# Patient Record
Sex: FEMALE | Race: WHITE | Employment: FULL TIME | URBAN - METROPOLITAN AREA
[De-identification: names, ages, dates, MRNs, and addresses within clinical notes are randomized per-mention and may not be internally consistent; named-entity substitution may affect disease eponyms.]

---

## 2017-08-30 ENCOUNTER — OFFICE VISIT (OUTPATIENT)
Dept: ORTHOPEDIC SURGERY | Age: 37
End: 2017-08-30

## 2017-08-30 VITALS
HEIGHT: 63 IN | HEART RATE: 88 BPM | SYSTOLIC BLOOD PRESSURE: 120 MMHG | DIASTOLIC BLOOD PRESSURE: 87 MMHG | WEIGHT: 210.1 LBS | BODY MASS INDEX: 37.23 KG/M2

## 2017-08-30 DIAGNOSIS — S63.502A LEFT WRIST SPRAIN, INITIAL ENCOUNTER: ICD-10-CM

## 2017-08-30 DIAGNOSIS — M25.532 WRIST PAIN, LEFT: Primary | ICD-10-CM

## 2017-08-30 PROCEDURE — L3908 WHO COCK-UP NONMOLDE PRE OTS: HCPCS | Performed by: PHYSICIAN ASSISTANT

## 2017-08-30 PROCEDURE — 99213 OFFICE O/P EST LOW 20 MIN: CPT | Performed by: PHYSICIAN ASSISTANT

## 2017-08-30 PROCEDURE — 73110 X-RAY EXAM OF WRIST: CPT | Performed by: PHYSICIAN ASSISTANT

## 2017-09-07 ENCOUNTER — OFFICE VISIT (OUTPATIENT)
Dept: ORTHOPEDIC SURGERY | Age: 37
End: 2017-09-07

## 2017-09-07 VITALS
HEIGHT: 63 IN | DIASTOLIC BLOOD PRESSURE: 82 MMHG | SYSTOLIC BLOOD PRESSURE: 128 MMHG | HEART RATE: 64 BPM | BODY MASS INDEX: 37.23 KG/M2 | WEIGHT: 210.1 LBS

## 2017-09-07 DIAGNOSIS — S63.502A LEFT WRIST SPRAIN, INITIAL ENCOUNTER: Primary | ICD-10-CM

## 2017-09-07 PROCEDURE — 99203 OFFICE O/P NEW LOW 30 MIN: CPT | Performed by: ORTHOPAEDIC SURGERY

## 2019-03-07 ENCOUNTER — HOSPITAL ENCOUNTER (OUTPATIENT)
Age: 39
Discharge: HOME | End: 2019-03-07
Payer: COMMERCIAL

## 2019-03-07 ENCOUNTER — HOSPITAL ENCOUNTER (OUTPATIENT)
Dept: GENERAL RADIOLOGY | Age: 39
Discharge: HOME | End: 2019-03-07
Payer: COMMERCIAL

## 2019-03-07 DIAGNOSIS — R10.32 ABDOMINAL PAIN, BILATERAL LOWER QUADRANT: ICD-10-CM

## 2019-03-07 DIAGNOSIS — R10.31 ABDOMINAL PAIN, BILATERAL LOWER QUADRANT: ICD-10-CM

## 2019-03-07 PROCEDURE — 74022 RADEX COMPL AQT ABD SERIES: CPT

## 2020-03-21 ENCOUNTER — NURSE TRIAGE (OUTPATIENT)
Dept: OTHER | Facility: CLINIC | Age: 40
End: 2020-03-21

## 2021-10-15 ENCOUNTER — OFFICE VISIT (OUTPATIENT)
Dept: ORTHOPEDIC SURGERY | Age: 41
End: 2021-10-15
Payer: MEDICAID

## 2021-10-15 VITALS
HEIGHT: 63 IN | BODY MASS INDEX: 38.09 KG/M2 | HEART RATE: 60 BPM | DIASTOLIC BLOOD PRESSURE: 83 MMHG | SYSTOLIC BLOOD PRESSURE: 119 MMHG | WEIGHT: 215 LBS

## 2021-10-15 DIAGNOSIS — M22.42 CHONDROMALACIA PATELLAE OF LEFT KNEE: ICD-10-CM

## 2021-10-15 DIAGNOSIS — M70.62 GREATER TROCHANTERIC BURSITIS OF LEFT HIP: Primary | ICD-10-CM

## 2021-10-15 PROCEDURE — 99203 OFFICE O/P NEW LOW 30 MIN: CPT | Performed by: ORTHOPAEDIC SURGERY

## 2021-10-15 NOTE — LETTER
[id number] [facility] [address]  Phone: [phone]  Fax: [phone]    Fariha President, MD        October 15, 2021    [address] [place] [address]   1980  DOS 10/15/2021  Chief Complaint    Knee Pain (Bilateral knee pain) and Hip Pain (Bilateral hip pain)       History of Present Illness:  Asael Breen is a 41 y.o. female presents for evaluation of left hip and left knee pain. Check she has pain in both hips and both knees but her left side is the worst and more symptomatic side that is an issue. She has had work-up for last 5 years including medicine physical therapy formal several times. She has had a cortisone injection into the left hip with no relief. She has difficulty laying on either side and her knee bothers her when she goes up and down steps or squats down with grinding which is symptomatic. Medical History:  Patient's medications, allergies, past medical, surgical, social and family histories were reviewed and updated as appropriate. Review of Systems:  Well-documented patient history form dated 10/15/2021  Relevant review of systems reviewed and available in the patient's chart    Vital Signs:  Vitals:    10/15/21 1130   BP: 119/83   Pulse: 60       General Exam:   Constitutional: Patient is adequately groomed with no evidence of malnutrition  DTRs: Deep tendon reflexes are intact  Mental Status: The patient is oriented to time, place and person. The patient's mood and affect are appropriate. Lymphatic: The lymphatic examination bilaterally reveals all areas to be without enlargement or induration. Vascular: Examination reveals no swelling or calf tenderness. Peripheral pulses are palpable and 2+. Neurological: The patient has good coordination. There is no weakness or sensory deficit.     Hip Examination:    Inspection: No visible lesions    Palpation: She has tenderness to palpation over left greater trochanter, and she has crepitus throughout range of motion which is mild to moderate in her patellofemoral joint with range of motion and symptomatic upon compression    Range of Motion: Normal symmetric in both lower extremities    Strength: Normal symmetric in both lower extremities    Special Tests: Negative straight leg raise negative Lasegue's maneuver    Skin: There are no rashes, ulcerations or lesions. Gait: Unremarkable    Reflex diminished and symmetric    Additional Comments:       Additional Examinations:         Right Lower Extremity: Examination of the right lower extremity does not show any tenderness, deformity or injury. Range of motion is unremarkable. There is no gross instability. There are no rashes, ulcerations or lesions. Strength and tone are normal.    Radiology:     X-rays 2 views of the right and 2 views of the left knee reveal some Glide changes but nothing acute    X-rays AP of the pelvis with frog's of the right and left hip reveal some minimal degenerative change but nothing acute         Assessment : Left knee pain secondary to chondromalacia patella and left hip pain secondary to greater trochanteric bursitis in all likelihood    Impression:  Encounter Diagnoses   Name Primary?  Greater trochanteric bursitis of left hip Yes    Chondromalacia patellae of left knee        Office Procedures:  No orders of the defined types were placed in this encounter. Treatment Plan: At this time since she is failed her 5 years to have her pain alleviated despite a variety of medications, injections, and formal structured physical therapy through several courses including steroids several times, I would recommend requesting an MRI of her left hip for disposition. She may benefit an evaluation by one of our hip preservation specialist          Rochelle Leyden.  MD Jose Baldwin MD

## 2021-10-15 NOTE — PROGRESS NOTES
symmetric in both lower extremities    Special Tests: Negative straight leg raise negative Lasegue's maneuver    Skin: There are no rashes, ulcerations or lesions. Gait: Unremarkable    Reflex diminished and symmetric    Additional Comments:       Additional Examinations:         Right Lower Extremity: Examination of the right lower extremity does not show any tenderness, deformity or injury. Range of motion is unremarkable. There is no gross instability. There are no rashes, ulcerations or lesions. Strength and tone are normal.    Radiology:     X-rays 2 views of the right and 2 views of the left knee reveal some Reynaldo changes but nothing acute    X-rays AP of the pelvis with frog's of the right and left hip reveal some minimal degenerative change but nothing acute         Assessment : Left knee pain secondary to chondromalacia patella and left hip pain secondary to greater trochanteric bursitis in all likelihood    Impression:  Encounter Diagnoses   Name Primary?  Greater trochanteric bursitis of left hip Yes    Chondromalacia patellae of left knee        Office Procedures:  No orders of the defined types were placed in this encounter. Treatment Plan: At this time since she is failed her 5 years to have her pain alleviated despite a variety of medications, injections, and formal structured physical therapy through several courses including steroids several times, I would recommend requesting an MRI of her left hip for disposition.   She may benefit an evaluation by one of our hip preservation specialist

## 2021-12-15 ENCOUNTER — CLINICAL DOCUMENTATION (OUTPATIENT)
Dept: OTHER | Age: 41
End: 2021-12-15

## 2021-12-28 ENCOUNTER — OFFICE VISIT (OUTPATIENT)
Dept: ORTHOPEDIC SURGERY | Age: 41
End: 2021-12-28
Payer: MEDICAID

## 2021-12-28 DIAGNOSIS — M70.62 GREATER TROCHANTERIC BURSITIS OF LEFT HIP: Primary | ICD-10-CM

## 2021-12-28 DIAGNOSIS — S73.192D TEAR OF LEFT ACETABULAR LABRUM, SUBSEQUENT ENCOUNTER: ICD-10-CM

## 2021-12-28 PROBLEM — S73.192A TEAR OF LEFT ACETABULAR LABRUM: Status: ACTIVE | Noted: 2021-12-28

## 2021-12-28 PROCEDURE — 99212 OFFICE O/P EST SF 10 MIN: CPT | Performed by: ORTHOPAEDIC SURGERY

## 2021-12-28 NOTE — PROGRESS NOTES
She returns today for evaluation of her left hip after MRI. The MRI does confirm that she has bursitis but also reveals a hip labral tear. Because of that I would recommend that she be referred to one of her hip preservation specialist, Dr. Nivia Tineo for evaluation to see if she can be a candidate for surgical intervention. Additionally she says she can work 12 hours back to back in and beyond 3 days but has difficulty if she works longer so I would recommend limiting her to 12-hour shifts at work for the next 3 months.

## 2021-12-28 NOTE — Clinical Note
[address] [place] [id number]  Phone: [phone]  Fax: [phone]    Clem Galvan MD        December 28, 2021     Patient: Bessy Burnett   YOB: 1980   Date of Visit: 12/28/2021       To Whom It May Concern: It is my medical opinion that Bessy Burnett {Work release (duty restriction):80214}. If you have any questions or concerns, please don't hesitate to call.     Sincerely,        Clem Galvan MD

## 2021-12-28 NOTE — LETTER
[address] [place] [address]  [facility] [address] [id number]  Phone: [phone]  Fax: [phone]    Rj Galvan MD        December 28, 2021     Patient: Aleyda Payton   YOB: 1980   Date of Visit: 12/28/2021       To Whom It May Concern: It is my medical opinion that [address] [place] 12 HR SHIFT. FOR NEXT 3 MONTHS. If you have any questions or concerns, please don't hesitate to call. Sincerely,          MD Rj Young MD

## 2022-02-17 ENCOUNTER — OFFICE VISIT (OUTPATIENT)
Dept: ORTHOPEDIC SURGERY | Age: 42
End: 2022-02-17
Payer: MEDICAID

## 2022-02-17 VITALS — BODY MASS INDEX: 39.12 KG/M2 | HEIGHT: 62 IN | WEIGHT: 212.6 LBS

## 2022-02-17 DIAGNOSIS — M70.62 GREATER TROCHANTERIC BURSITIS OF LEFT HIP: Primary | ICD-10-CM

## 2022-02-17 DIAGNOSIS — S73.192D TEAR OF LEFT ACETABULAR LABRUM, SUBSEQUENT ENCOUNTER: ICD-10-CM

## 2022-02-17 DIAGNOSIS — M25.852 IMPINGEMENT SYNDROME, HIP, LEFT: ICD-10-CM

## 2022-02-17 PROCEDURE — 20611 DRAIN/INJ JOINT/BURSA W/US: CPT | Performed by: ORTHOPAEDIC SURGERY

## 2022-02-17 RX ORDER — BETAMETHASONE SODIUM PHOSPHATE AND BETAMETHASONE ACETATE 3; 3 MG/ML; MG/ML
12 INJECTION, SUSPENSION INTRA-ARTICULAR; INTRALESIONAL; INTRAMUSCULAR; SOFT TISSUE ONCE
Status: COMPLETED | OUTPATIENT
Start: 2022-02-17 | End: 2022-02-17

## 2022-02-17 RX ORDER — BUPIVACAINE HYDROCHLORIDE 2.5 MG/ML
30 INJECTION, SOLUTION INFILTRATION; PERINEURAL ONCE
Status: COMPLETED | OUTPATIENT
Start: 2022-02-17 | End: 2022-02-17

## 2022-02-17 RX ORDER — LIDOCAINE HYDROCHLORIDE 10 MG/ML
20 INJECTION, SOLUTION INFILTRATION; PERINEURAL ONCE
Status: COMPLETED | OUTPATIENT
Start: 2022-02-17 | End: 2022-02-17

## 2022-02-17 RX ADMIN — BETAMETHASONE SODIUM PHOSPHATE AND BETAMETHASONE ACETATE 12 MG: 3; 3 INJECTION, SUSPENSION INTRA-ARTICULAR; INTRALESIONAL; INTRAMUSCULAR; SOFT TISSUE at 11:34

## 2022-02-17 RX ADMIN — LIDOCAINE HYDROCHLORIDE 20 ML: 10 INJECTION, SOLUTION INFILTRATION; PERINEURAL at 11:35

## 2022-02-17 RX ADMIN — BUPIVACAINE HYDROCHLORIDE 75 MG: 2.5 INJECTION, SOLUTION INFILTRATION; PERINEURAL at 11:35

## 2022-02-17 NOTE — PROGRESS NOTES
Dr Mike Zee      Date /Time 2/17/2022       12:08 PM EST  Name Christo Connor             1980   Location  [facility]  MRN <[id number]>                Chief Complaint   Patient presents with    New Patient     Left Hip Eval - Ref. by Bonny Rush        History of Present Illness    Christo Connor is a 35 y.o. female who presents with  left hip pain. Sent in consultation by  Luis Peña MD.    Occupation: [employer] at [employer]  Occupational activities: heavy lifting occasionally. Athletic/exercise activity: no sports. Injury Mechanism:  none. Worker's Comp. & legal issues:   none. Previous Treatments: Ice, Heat, NSAIDs, pain medication and Cortisone Injections Lateral cortisone injection of the trochanter    She presents with longstanding history of left hip pain. Remotely, she has had left hip injection done previously for the trochanteric bursitis. She has had previous stretching exercises and therapy done for this which have not helped thus far. She is here for anteriorly and laterally based hip pain. She underwent an MRI that demonstrated labral tear and she is here for clinical evaluation. She has difficult time getting in and out of her car and tying her shoes. She has a pain when she gets up from a prolonged sitting position. She has difficult time working for long hours and is activity related discomfort    Past History  Past Medical History:   Diagnosis Date    Acid indigestion     Fibromyalgia     GERD (gastroesophageal reflux disease)     Obesity (BMI 30-39. 9)      Past Surgical History:   Procedure Laterality Date    CHOLECYSTECTOMY      ENDOSCOPY, COLON, DIAGNOSTIC      TONSILLECTOMY      TUBAL LIGATION       Family History   Problem Relation Age of Onset    High Blood Pressure Mother     Diabetes Mother     Cancer Father      Social History     Tobacco Use    Smoking status: Former Smoker     Packs/day: 0.50     Years: 18.00     Pack years: 9.00    Smokeless tobacco: Never Used    Tobacco comment: smokes half pack now. was smoking 1 pack/day   Substance Use Topics    Alcohol use: No      Current Outpatient Medications on File Prior to Visit   Medication Sig Dispense Refill    ferrous sulfate 325 (65 FE) MG tablet Take 325 mg by mouth daily (with breakfast).  pantoprazole (PROTONIX) 20 MG tablet Take 20 mg by mouth daily. No current facility-administered medications on file prior to visit. ASCVD 10-YEAR RISK SCORE  The ASCVD Risk score (Davian Escobar, et al., 2013) failed to calculate for the following reasons: The systolic blood pressure is missing    Cannot find a previous HDL lab    Cannot find a previous total cholesterol lab   . Review of Systems  10-point ROS is negative other than HPI. Physical Exam  Based off 1997 Exam Criteria  Ht 5' 2.25\" (1.581 m)   Wt 212 lb 9.6 oz (96.4 kg)   BMI 38.57 kg/m²      Constitutional:       General: He is not in acute distress. Appearance: Normal appearance. Obese. Cardiovascular:      Rate and Rhythm: Normal rate and regular rhythm. Pulses: Normal pulses. Pulmonary:      Effort: Pulmonary effort is normal. No respiratory distress. Neurological:      Mental Status: He is alert and oriented to person, place, and time. Mental status is at baseline.      Musculoskeletal:  Gait:  antalgic    Spine / Hip Exam:      RIGHT  LEFT    Lumbar Spine Exam  [x] All Neg    [x] All Neg     Straight leg raise  []  []Not tested   []  []Not tested    Clonus  []  []Not tested   []  []Not tested    Pain with motion  []  []Not tested   []  []Not tested    Radiculopathy  []  []Not tested   []  []Not tested    Paraspinal muscle tenderness  [] Paraspinal  []Midline   [] Paraspinal  []Midline   Sensation RIGHT  LEFT    L3  [x] Normal []Decreased    [x] Normal []Decreased   L4  [x] Normal  []Decreased   [x] Normal []Decreased   L5  [x] Normal []Decreased   [x] Normal []Decreased   S1  [x] Normal []Decreased   [x] Normal []Decreased   Pelvis       Scoliosis  [x] Nml  [] Present     Leg-length discrepency  [x] Equal  [] Right longer   [] Left longer   Range of Motion Active Passive Active Passive   Hip Flexion 120  120    Abduction 50  50    External Rotation @ 90 flex 65  65    Internal Rotation @ 90 flex 20  10           Hip Impingement / Dysplasia  [] All Neg  [] Not tested   [] All Neg  [] Not tested    Hip impingement test  [x]  []Not tested   [x]  []Not tested    C-sign  []  []Not tested   [x]  []Not tested    Anterior instability apprehension  []  []Not tested   []  []Not tested    Posterior instability apprehension  []  []Not tested   []  []Not tested    Uncontained Internal rotation  []  []Not tested  []  []Not tested          Abductors  [] All Neg  [] Not tested   [] All Neg  [] Not tested    Medius strength  []  []Not tested   []  []Not tested    Minimum strength  []  []Not tested   []  []Not tested    IT band tendonitis  []  []Not tested   []  []Not tested    Trochanteric tenderness  []  []Not tested  [x]  []Not tested   Sciatic neuropathic pain  []  []Not tested   []  []Not tested           Post-arthroplasty  [] All Neg  [] Not tested   [] All Neg  [] Not tested    Rectus tendonitis  []  []Not tested   []  []Not tested    Iliopsoas tendonitis       Start-up pain  []  []Not tested   []  []Not tested      Positive Stinchfield and impingement signs. Also tenderness present over the trochanter, antalgic gait. Imaging    Left Hip: [facility] [facility]  Radiographs: Signs of WILMAR with cam and pincer pathology. Large cam lesion identified. MRI: Demonstrates anterolateral labral tear, confirm signs of WILMAR, demonstrate signs of trochanteric bursitis without evidence of true rupture.       Procedure:  Orders Placed This Encounter   Procedures    XR HIP LEFT (2-3 VIEWS)     Standing Status:   Future     Number of Occurrences:   1     Standing Expiration Date:   2/16/2023    US ARTHR/ASP/INJ MAJOR JNT/BURSA LEFT     Standing Status:   Future     Number of Occurrences:   1     Standing Expiration Date:   2/17/2023       Assessment and Plan  Jennie Moulton was seen today for new patient. Diagnoses and all orders for this visit:    Greater trochanteric bursitis of left hip  -     XR HIP LEFT (2-3 VIEWS); Future  -     US ARTHR/ASP/INJ MAJOR JNT/BURSA LEFT; Future    Tear of left acetabular labrum, subsequent encounter  -     XR HIP LEFT (2-3 VIEWS); Future  -     US ARTHR/ASP/INJ MAJOR JNT/BURSA LEFT; Future    Impingement syndrome, hip, left  -     US ARTHR/ASP/INJ MAJOR JNT/BURSA LEFT; Future    Other orders  -     lidocaine 1 % injection 20 mL  -     bupivacaine (MARCAINE) 0.25 % injection 75 mg  -     betamethasone acetate-betamethasone sodium phosphate (CELESTONE) injection 12 mg        I discussed with Bear Cook that her history, symptoms, signs and imaging are most consistent with labral tear, femoro-acetabular impingement and Trochanteric bursitis    We reviewed the natural history of these conditions and treatment options ranging from conservative measures (rest, icing, activity modification, physical therapy, pain meds, cortisone injection) to surgical options. In terms of treatment, I recommended starting with rest, icing, avoidance of painful activities, NSAIDs or pain meds as tolerated, and physical therapy. Left Hip Cortisone Injection - Ultrasound-guided CPT: 38441   Consent was obtained after discussion of the risks, benefits, alternatives, including, but not limited to bleeding, pain, infection, skin disruption or discoloration. Laterality was confirmed (timeout). The hip was prepped with alcohol.  Deep ultrasound was used to visualize the femoral head, neck, and acetabular rim. 22-gauge spinal needle was used. I confirmed transducer orientation along the axis of the femoral neck. [employer] ultrasound unit was used with a variable frequency (6.0-15.0 MHz) linear transducer. Ultrasound-guided needle localization to the hip joint was performed. Confirmation of needle placement was performed, and the image was stored  A formulation of 2cc of Celestone, 1cc of 1% lidocaine, 1cc of 0.25% sensoricaine was injected into the hip. Appropriate insufflation deep to the hip capsule was visualized. The site was cleaned and dressed with a band aid. Images were taken and saved for permanent record.  She tolerated this well and there were no complications. She will let us know next time how his pain relief she has. We tried reaching patient with no response yet    We discussed surgical options as well, should conservative measures fail. Electronically signed by Cat Cornelius MD on 2/17/2022 at 12:08 PM  This dictation was generated by voice recognition computer software. Although all attempts are made to edit the dictation for accuracy, there may be errors in the transcription that are not intended.

## 2022-02-17 NOTE — LETTER
[address] [place] [address] Dr Margaux Steve [facility] [address]  Phone: [phone]  Fax: [phone]    Rupa Altamirano MD        February 17, 2022     Patient: Macho Becerril   YOB: 1980   Date of Visit: 2/17/2022       To Whom It May Concern: It is my medical opinion that Macho Becerril may return to work on 02/17/2022 No More than 12 hours per shift for the next 3 months. .    If you have any questions or concerns, please don't hesitate to call.     Sincerely,      Rupa Altamirano MD  [address] [facility]

## 2022-04-28 ENCOUNTER — OFFICE VISIT (OUTPATIENT)
Dept: ORTHOPEDIC SURGERY | Age: 42
End: 2022-04-28
Payer: MEDICAID

## 2022-04-28 VITALS — BODY MASS INDEX: 39.01 KG/M2 | HEIGHT: 62 IN | WEIGHT: 212 LBS

## 2022-04-28 DIAGNOSIS — M25.852 IMPINGEMENT SYNDROME, HIP, LEFT: ICD-10-CM

## 2022-04-28 DIAGNOSIS — M70.62 GREATER TROCHANTERIC BURSITIS OF LEFT HIP: Primary | ICD-10-CM

## 2022-04-28 DIAGNOSIS — S73.192D TEAR OF LEFT ACETABULAR LABRUM, SUBSEQUENT ENCOUNTER: ICD-10-CM

## 2022-04-28 PROCEDURE — 99213 OFFICE O/P EST LOW 20 MIN: CPT | Performed by: PHYSICIAN ASSISTANT

## 2022-04-28 NOTE — LETTER
[address] [place] [address] Dr Florence E [address] [place] [address]  Phone: [phone]  Fax: [phone]    Razia Lala MD        April 28, 2022     Patient: Braulio Peña   YOB: 1980   Date of Visit: 4/28/2022       To Whom It May Concern: It is my medical opinion that Braulio Peña should reduce work hours to 12 hours per day until 7/28/2022. If you have any questions or concerns, please don't hesitate to call.     Sincerely,        Razia Lala MD

## 2022-04-28 NOTE — PROGRESS NOTES
Dr Jacob Deshpande      Date /Time 4/28/2022       12:08 PM EST  Name Arsen Moran             1980   Location  [facility]  MRN [id number]                Chief Complaint   Patient presents with    Hip Pain     Ck L hip. Patient states that she got good relief with the injection. History of Present Illness    Arsen Moran is a 35 y.o. female who presents with  left hip pain. Occupation: [employer] at [employer]  Occupational activities: heavy lifting occasionally. Athletic/exercise activity: no sports. Injury Mechanism:  none. Worker's Comp. & legal issues:   none. Previous Treatments: Ice, Heat, NSAIDs, pain medication and Cortisone Injections Lateral cortisone injection of the trochanter    Patient presents to the office today for follow-up visit. February 2022 patient received a cortisone injection intra-articular left hip. She has done well. Her current pain is only 1-2/10. She was asymptomatic for many weeks. Her pain is gradually started to return but symptoms are fairly mild. Symptoms still concentrated over her groin. Previous history: She presents with longstanding history of left hip pain. Remotely, she has had left hip injection done previously for the trochanteric bursitis. She has had previous stretching exercises and therapy done for this which have not helped thus far. She is here for anteriorly and laterally based hip pain. She underwent an MRI that demonstrated labral tear and she is here for clinical evaluation. She has difficult time getting in and out of her car and tying her shoes. She has a pain when she gets up from a prolonged sitting position. She has difficult time working for long hours and is activity related discomfort    Past History  Past Medical History:   Diagnosis Date    Acid indigestion     Fibromyalgia     GERD (gastroesophageal reflux disease)     Obesity (BMI 30-39. 9)      Past Surgical History:   Procedure Laterality Date    CHOLECYSTECTOMY      ENDOSCOPY, COLON, DIAGNOSTIC      TONSILLECTOMY      TUBAL LIGATION       Family History   Problem Relation Age of Onset    High Blood Pressure Mother     Diabetes Mother     Cancer Father      Social History     Tobacco Use    Smoking status: Former Smoker     Packs/day: 0.50     Years: 18.00     Pack years: 9.00    Smokeless tobacco: Never Used    Tobacco comment: smokes half pack now. was smoking 1 pack/day   Substance Use Topics    Alcohol use: No      Current Outpatient Medications on File Prior to Visit   Medication Sig Dispense Refill    ferrous sulfate 325 (65 FE) MG tablet Take 325 mg by mouth daily (with breakfast).  pantoprazole (PROTONIX) 20 MG tablet Take 20 mg by mouth daily. No current facility-administered medications on file prior to visit. ASCVD 10-YEAR RISK SCORE  The ASCVD Risk score (Tian Alicia, et al., 2013) failed to calculate for the following reasons: The systolic blood pressure is missing    Cannot find a previous HDL lab    Cannot find a previous total cholesterol lab   . Review of Systems  10-point ROS is negative other than HPI. Physical Exam  Based off 1997 Exam Criteria  Ht 5' 2\" (1.575 m)   Wt 212 lb (96.2 kg)   BMI 38.78 kg/m²      Constitutional:       General: He is not in acute distress. Appearance: Normal appearance. Obese. Cardiovascular:      Rate and Rhythm: Normal rate and regular rhythm. Pulses: Normal pulses. Pulmonary:      Effort: Pulmonary effort is normal. No respiratory distress. Neurological:      Mental Status: He is alert and oriented to person, place, and time. Mental status is at baseline.      Musculoskeletal:  Gait:  antalgic    Spine / Hip Exam:      RIGHT  LEFT    Lumbar Spine Exam  [x] All Neg    [x] All Neg     Straight leg raise  []  []Not tested   []  []Not tested    Clonus  []  []Not tested   []  []Not tested    Pain with motion  []  []Not tested   []  []Not tested    Radiculopathy  []  []Not tested   []  []Not tested    Paraspinal muscle tenderness  [] Paraspinal  []Midline   [] Paraspinal  []Midline   Sensation RIGHT  LEFT    L3  [x] Normal []Decreased    [x] Normal []Decreased   L4  [x] Normal  []Decreased   [x] Normal []Decreased   L5  [x] Normal []Decreased   [x] Normal []Decreased   S1  [x] Normal  []Decreased   [x] Normal []Decreased   Pelvis       Scoliosis  [x] Nml  [] Present     Leg-length discrepency  [x] Equal  [] Right longer   [] Left longer   Range of Motion Active Passive Active Passive   Hip Flexion 120  120    Abduction 50  50    External Rotation @ 90 flex 65  65    Internal Rotation @ 90 flex 20  10           Hip Impingement / Dysplasia  [] All Neg  [] Not tested   [] All Neg  [] Not tested    Hip impingement test  [x]  []Not tested   [x]  []Not tested    C-sign  []  []Not tested   [x]  []Not tested    Anterior instability apprehension  []  []Not tested   []  []Not tested    Posterior instability apprehension  []  []Not tested   []  []Not tested    Uncontained Internal rotation  []  []Not tested  []  []Not tested          Abductors  [] All Neg  [] Not tested   [] All Neg  [] Not tested    Medius strength  []  []Not tested   []  []Not tested    Minimum strength  []  []Not tested   []  []Not tested    IT band tendonitis  []  []Not tested   []  []Not tested    Trochanteric tenderness  []  []Not tested  [x]  []Not tested   Sciatic neuropathic pain  []  []Not tested   []  []Not tested           Post-arthroplasty  [] All Neg  [] Not tested   [] All Neg  [] Not tested    Rectus tendonitis  []  []Not tested   []  []Not tested    Iliopsoas tendonitis       Start-up pain  []  []Not tested   []  []Not tested      Positive Stinchfield and impingement signs. Also tenderness present over the trochanter, antalgic gait. Imaging    Left Hip: [address]  Previous Radiographs: Signs of WILMAR with cam and pincer pathology.   Large cam lesion identified. MRI: Demonstrates anterolateral labral tear, confirm signs of WILMAR, demonstrate signs of trochanteric bursitis without evidence of true rupture. Procedure:  No orders of the defined types were placed in this encounter. Assessment and Plan  Frandy Barnett was seen today for hip pain. Diagnoses and all orders for this visit:    Greater trochanteric bursitis of left hip    Tear of left acetabular labrum, subsequent encounter    Impingement syndrome, hip, left        I discussed with Manisha Saul that her history, symptoms, signs and imaging are most consistent with labral tear, femoro-acetabular impingement and Trochanteric bursitis. Patient is doing well with the injection. Would not recommend repeating injection unless absolutely necessary. It can cause rapidly progressive degenerative joint disease. Neck step would be arthroscopic surgery for impingement and labral repair. We have placed her on restrictions at work but this is the last time we can do this until surgery. Follow-up in 3 months or sooner if problems arise peer    We reviewed the natural history of these conditions and treatment options ranging from conservative measures (rest, icing, activity modification, physical therapy, pain meds, cortisone injection) to surgical options. In terms of treatment, I recommended continuing with rest, icing, avoidance of painful activities, NSAIDs or pain meds as tolerated, and physical therapy. If these are not effective, cortisone injection can be considered. We discussed surgical options as well, should conservative measures fail. Electronically signed by Brittani Guerrero PA-C on 4/28/2022 at 2:16 PM  This dictation was generated by voice recognition computer software. Although all attempts are made to edit the dictation for accuracy, there may be errors in the transcription that are not intended.

## 2022-06-23 ENCOUNTER — OFFICE VISIT (OUTPATIENT)
Dept: ORTHOPEDIC SURGERY | Age: 42
End: 2022-06-23
Payer: MEDICAID

## 2022-06-23 VITALS — WEIGHT: 212 LBS | HEIGHT: 62 IN | BODY MASS INDEX: 39.01 KG/M2

## 2022-06-23 DIAGNOSIS — S73.192D TEAR OF LEFT ACETABULAR LABRUM, SUBSEQUENT ENCOUNTER: Primary | ICD-10-CM

## 2022-06-23 DIAGNOSIS — M25.852 IMPINGEMENT SYNDROME, HIP, LEFT: ICD-10-CM

## 2022-06-23 PROCEDURE — 20611 DRAIN/INJ JOINT/BURSA W/US: CPT | Performed by: ORTHOPAEDIC SURGERY

## 2022-06-23 RX ORDER — BUPIVACAINE HYDROCHLORIDE 2.5 MG/ML
10 INJECTION, SOLUTION INFILTRATION; PERINEURAL ONCE
Status: COMPLETED | OUTPATIENT
Start: 2022-06-23 | End: 2022-06-23

## 2022-06-23 RX ORDER — TRIAMCINOLONE ACETONIDE 40 MG/ML
80 INJECTION, SUSPENSION INTRA-ARTICULAR; INTRAMUSCULAR ONCE
Status: COMPLETED | OUTPATIENT
Start: 2022-06-23 | End: 2022-06-23

## 2022-06-23 RX ORDER — LIDOCAINE HYDROCHLORIDE 10 MG/ML
40 INJECTION, SOLUTION INFILTRATION; PERINEURAL ONCE
Status: COMPLETED | OUTPATIENT
Start: 2022-06-23 | End: 2022-06-23

## 2022-06-23 RX ADMIN — LIDOCAINE HYDROCHLORIDE 40 MG: 10 INJECTION, SOLUTION INFILTRATION; PERINEURAL at 14:17

## 2022-06-23 RX ADMIN — BUPIVACAINE HYDROCHLORIDE 10 MG: 2.5 INJECTION, SOLUTION INFILTRATION; PERINEURAL at 14:17

## 2022-06-23 RX ADMIN — TRIAMCINOLONE ACETONIDE 80 MG: 40 INJECTION, SUSPENSION INTRA-ARTICULAR; INTRAMUSCULAR at 14:17

## 2022-06-23 NOTE — PROGRESS NOTES
Dr Elle Berry      Date /Time 6/23/2022       12:08 PM EST  Name Nikko Santoro             1980   [facility]  MRN [id number]                Chief Complaint   Patient presents with    Hip Pain     CK LEFT HIP        History of Present Illness    Nikko Santoro is a 35 y.o. female who presents with  left hip pain. Occupation: [employer] at [employer]  Occupational activities: heavy lifting occasionally. Athletic/exercise activity: no sports. Injury Mechanism:  none. Worker's Comp. & legal issues:   none. Previous Treatments: Ice, Heat, NSAIDs, pain medication and Cortisone Injections Lateral cortisone injection of the trochanter    Patient presents to the office today for follow-up visit concerning her left hip. Patient being treated for left hip impingement. Her symptoms have returned without new injury or trauma. Most symptoms are concentrated in her groin. She does have increased pain with activities and improvement with rest.    Previous history: Patient presents to the office today for follow-up visit. February 2022 patient received a cortisone injection intra-articular left hip. She has done well. Her current pain is only 1-2/10. She was asymptomatic for many weeks. Her pain is gradually started to return but symptoms are fairly mild. Symptoms still concentrated over her groin. Previous history: She presents with longstanding history of left hip pain. Remotely, she has had left hip injection done previously for the trochanteric bursitis. She has had previous stretching exercises and therapy done for this which have not helped thus far. She is here for anteriorly and laterally based hip pain. She underwent an MRI that demonstrated labral tear and she is here for clinical evaluation. She has difficult time getting in and out of her car and tying her shoes. She has a pain when she gets up from a prolonged sitting position.   She has difficult time working for long hours and is activity related discomfort    Past History  Past Medical History:   Diagnosis Date    Acid indigestion     Fibromyalgia     GERD (gastroesophageal reflux disease)     Obesity (BMI 30-39. 9)      Past Surgical History:   Procedure Laterality Date    CHOLECYSTECTOMY      ENDOSCOPY, COLON, DIAGNOSTIC      TONSILLECTOMY      TUBAL LIGATION       Family History   Problem Relation Age of Onset    High Blood Pressure Mother     Diabetes Mother     Cancer Father      Social History     Tobacco Use    Smoking status: Former Smoker     Packs/day: 0.50     Years: 18.00     Pack years: 9.00    Smokeless tobacco: Never Used    Tobacco comment: smokes half pack now. was smoking 1 pack/day   Substance Use Topics    Alcohol use: No      Current Outpatient Medications on File Prior to Visit   Medication Sig Dispense Refill    ferrous sulfate 325 (65 FE) MG tablet Take 325 mg by mouth daily (with breakfast).  pantoprazole (PROTONIX) 20 MG tablet Take 20 mg by mouth daily. No current facility-administered medications on file prior to visit. ASCVD 10-YEAR RISK SCORE  The ASCVD Risk score (Nnamdi Castelan., et al., 2013) failed to calculate for the following reasons: The systolic blood pressure is missing    Cannot find a previous HDL lab    Cannot find a previous total cholesterol lab   . Review of Systems  10-point ROS is negative other than HPI. Physical Exam  Based off 1997 Exam Criteria  Ht 5' 2\" (1.575 m)   Wt 212 lb (96.2 kg)   BMI 38.78 kg/m²      Constitutional:       General: He is not in acute distress. Appearance: Normal appearance. Obese. Cardiovascular:      Rate and Rhythm: Normal rate and regular rhythm. Pulses: Normal pulses. Pulmonary:      Effort: Pulmonary effort is normal. No respiratory distress. Neurological:      Mental Status: He is alert and oriented to person, place, and time. Mental status is at baseline. Musculoskeletal:  Gait:  antalgic    Spine / Hip Exam:      RIGHT  LEFT    Lumbar Spine Exam  [x] All Neg    [x] All Neg     Straight leg raise  []  []Not tested   []  []Not tested    Clonus  []  []Not tested   []  []Not tested    Pain with motion  []  []Not tested   []  []Not tested    Radiculopathy  []  []Not tested   []  []Not tested    Paraspinal muscle tenderness  [] Paraspinal  []Midline   [] Paraspinal  []Midline   Sensation RIGHT  LEFT    L3  [x] Normal []Decreased    [x] Normal []Decreased   L4  [x] Normal  []Decreased   [x] Normal []Decreased   L5  [x] Normal []Decreased   [x] Normal []Decreased   S1  [x] Normal  []Decreased   [x] Normal []Decreased   Pelvis       Scoliosis  [x] Nml  [] Present     Leg-length discrepency  [x] Equal  [] Right longer   [] Left longer   Range of Motion Active Passive Active Passive   Hip Flexion 120  120    Abduction 50  50    External Rotation @ 90 flex 65  65    Internal Rotation @ 90 flex 20  10           Hip Impingement / Dysplasia  [] All Neg  [] Not tested   [] All Neg  [] Not tested    Hip impingement test  [x]  []Not tested   [x]  []Not tested    C-sign  []  []Not tested   [x]  []Not tested    Anterior instability apprehension  []  []Not tested   []  []Not tested    Posterior instability apprehension  []  []Not tested   []  []Not tested    Uncontained Internal rotation  []  []Not tested  []  []Not tested          Abductors  [] All Neg  [] Not tested   [] All Neg  [] Not tested    Medius strength  []  []Not tested   []  []Not tested    Minimum strength  []  []Not tested   []  []Not tested    IT band tendonitis  []  []Not tested   []  []Not tested    Trochanteric tenderness  []  []Not tested  [x]  []Not tested   Sciatic neuropathic pain  []  []Not tested   []  []Not tested           Post-arthroplasty  [] All Neg  [] Not tested   [] All Neg  [] Not tested    Rectus tendonitis  []  []Not tested   []  []Not tested    Iliopsoas tendonitis       Start-up pain  []  []Not tested   []  []Not tested      Positive Stinchfield and impingement signs. Also tenderness present over the trochanter, antalgic gait. Imaging    Left Hip: [address]  Previous Radiographs: Signs of WILMAR with cam and pincer pathology. Large cam lesion identified. MRI: Demonstrates anterolateral labral tear, confirm signs of WILMAR, demonstrate signs of trochanteric bursitis without evidence of true rupture. Procedure:  Orders Placed This Encounter   Procedures    US ARTHR/ASP/INJ MAJOR JNT/BURSA LEFT     Standing Status:   Future     Number of Occurrences:   1     Standing Expiration Date:   7/23/2022     Order Specific Question:   Reason for exam:     Answer:   PAIN     Left Hip Cortisone Injection - Ultrasound-guided CPT: 07978   Consent was obtained after discussion of the risks, benefits, alternatives, including, but not limited to bleeding, pain, infection, skin disruption or discoloration. Laterality was confirmed (timeout). The hip was prepped with alcohol.  Deep ultrasound was used to visualize the femoral head, neck, and acetabular rim. 22-gauge spinal needle was used. I confirmed transducer orientation along the axis of the femoral neck. Son[employer] ultrasound unit was used with a variable frequency (6.0-15.0 MHz) linear transducer. Ultrasound-guided needle localization to the hip joint was performed. Confirmation of needle placement was performed, and the image was stored  A formulation of 2cc of Celestone, 1cc of 1% lidocaine, 1cc of 0.25% sensoricaine was injected into the hip. Appropriate insufflation deep to the hip capsule was visualized. The site was cleaned and dressed with a band aid. Images were taken and saved for permanent record. .      Assessment and Plan  Thelma Bermeo was seen today for hip pain. Diagnoses and all orders for this visit:    Tear of left acetabular labrum, subsequent encounter  -     US ARTHR/ASP/INJ MAJOR JNT/BURSA LEFT;  Future  -     bupivacaine (MARCAINE) 0.25 % injection 10 mg  -     lidocaine 1 % injection 40 mg  -     Celestone 2 cc    Impingement syndrome, hip, left        I discussed with Sharon Del Angel that her history, symptoms, signs and imaging are most consistent with labral tear, femoro-acetabular impingement and Trochanteric bursitis. Patient is planning on surgery this November. Today we will perform a left hip intra-articular injection with ultrasound. Neck step would be arthroscopic surgery for impingement and labral repair. We have placed her on restrictions at work but this is the last time we can do this until surgery. Patient would like to also get her right hip evaluated at some point. She will follow-up next week for her right hip. We reviewed the natural history of these conditions and treatment options ranging from conservative measures (rest, icing, activity modification, physical therapy, pain meds, cortisone injection) to surgical options. In terms of treatment, I recommended continuing with rest, icing, avoidance of painful activities, NSAIDs or pain meds as tolerated, and physical therapy. If these are not effective, cortisone injection can be considered. We discussed surgical options as well, should conservative measures fail. Electronically signed by America Peña MD on 6/23/2022 at 2:16 PM  This dictation was generated by voice recognition computer software. Although all attempts are made to edit the dictation for accuracy, there may be errors in the transcription that are not intended.

## 2022-06-28 ENCOUNTER — OFFICE VISIT (OUTPATIENT)
Dept: ORTHOPEDIC SURGERY | Age: 42
End: 2022-06-28
Payer: MEDICAID

## 2022-06-28 VITALS — HEIGHT: 62 IN | BODY MASS INDEX: 39.01 KG/M2 | WEIGHT: 212 LBS

## 2022-06-28 DIAGNOSIS — M25.551 RIGHT HIP PAIN: Primary | ICD-10-CM

## 2022-06-28 DIAGNOSIS — M25.851 FEMOROACETABULAR IMPINGEMENT OF RIGHT HIP: ICD-10-CM

## 2022-06-28 PROCEDURE — 20611 DRAIN/INJ JOINT/BURSA W/US: CPT | Performed by: ORTHOPAEDIC SURGERY

## 2022-06-28 RX ORDER — BUPIVACAINE HYDROCHLORIDE 2.5 MG/ML
2.5 INJECTION, SOLUTION INFILTRATION; PERINEURAL ONCE
Status: COMPLETED | OUTPATIENT
Start: 2022-06-28 | End: 2022-06-28

## 2022-06-28 RX ORDER — LIDOCAINE HYDROCHLORIDE 10 MG/ML
10 INJECTION, SOLUTION INFILTRATION; PERINEURAL ONCE
Status: COMPLETED | OUTPATIENT
Start: 2022-06-28 | End: 2022-06-28

## 2022-06-28 RX ORDER — TRIAMCINOLONE ACETONIDE 40 MG/ML
80 INJECTION, SUSPENSION INTRA-ARTICULAR; INTRAMUSCULAR ONCE
Status: CANCELLED | OUTPATIENT
Start: 2022-06-28 | End: 2022-06-28

## 2022-06-28 RX ORDER — BETAMETHASONE SODIUM PHOSPHATE AND BETAMETHASONE ACETATE 3; 3 MG/ML; MG/ML
12 INJECTION, SUSPENSION INTRA-ARTICULAR; INTRALESIONAL; INTRAMUSCULAR; SOFT TISSUE ONCE
Status: COMPLETED | OUTPATIENT
Start: 2022-06-28 | End: 2022-06-28

## 2022-06-28 RX ADMIN — BUPIVACAINE HYDROCHLORIDE 2.5 MG: 2.5 INJECTION, SOLUTION INFILTRATION; PERINEURAL at 10:19

## 2022-06-28 RX ADMIN — LIDOCAINE HYDROCHLORIDE 10 MG: 10 INJECTION, SOLUTION INFILTRATION; PERINEURAL at 10:19

## 2022-06-28 RX ADMIN — BETAMETHASONE SODIUM PHOSPHATE AND BETAMETHASONE ACETATE 12 MG: 3; 3 INJECTION, SUSPENSION INTRA-ARTICULAR; INTRALESIONAL; INTRAMUSCULAR; SOFT TISSUE at 10:19

## 2022-06-28 NOTE — PROGRESS NOTES
Dr Razia Lala      Date /Time 6/28/2022       10:54 AM EDT  Name Braulio Peña             1980   Location  [facility]  MRN [id number]                Chief Complaint   Patient presents with    Hip Pain     RIGHT HIP         History of Present Illness    Braulio Peña is a 35 y.o. female who presents with  right hip pain. Sent in consultation by No primary care provider on file. ,. Athletic/exercise activity: no sports. Injury Mechanism:  none. Worker's Comp. & legal issues:   none. Previous Treatments: Ice, Heat and NSAIDs    Patient presents the office today for a new problem. She is well-known to us for her left hip and is being treated for left hip impingement. Her right hip has now become symptomatic. She does have pain laterally and into her groin. She has increased pain with activities and improvement with rest.  She has had no injury or trauma. She denies any lumbar pain or radicular symptoms. Past History  Past Medical History:   Diagnosis Date    Acid indigestion     Fibromyalgia     GERD (gastroesophageal reflux disease)     Obesity (BMI 30-39. 9)      Past Surgical History:   Procedure Laterality Date    CHOLECYSTECTOMY      ENDOSCOPY, COLON, DIAGNOSTIC      TONSILLECTOMY      TUBAL LIGATION       Family History   Problem Relation Age of Onset    High Blood Pressure Mother     Diabetes Mother     Cancer Father      Social History     Tobacco Use    Smoking status: Former Smoker     Packs/day: 0.50     Years: 18.00     Pack years: 9.00    Smokeless tobacco: Never Used    Tobacco comment: smokes half pack now. was smoking 1 pack/day   Substance Use Topics    Alcohol use: No      Current Outpatient Medications on File Prior to Visit   Medication Sig Dispense Refill    ferrous sulfate 325 (65 FE) MG tablet Take 325 mg by mouth daily (with breakfast).  pantoprazole (PROTONIX) 20 MG tablet Take 20 mg by mouth daily.        No current facility-administered medications on file prior to visit. ASCVD 10-YEAR RISK SCORE  The ASCVD Risk score (Angela Luong., et al., 2013) failed to calculate for the following reasons: The systolic blood pressure is missing    Cannot find a previous HDL lab    Cannot find a previous total cholesterol lab   . Review of Systems  10-point ROS is negative other than HPI. Physical Exam  Based off 1997 Exam Criteria  Ht 5' 2\" (1.575 m)   Wt 212 lb (96.2 kg)   BMI 38.78 kg/m²      Constitutional:       General: He is not in acute distress. Appearance: Normal appearance. Cardiovascular:      Rate and Rhythm: Normal rate and regular rhythm. Pulses: Normal pulses. Pulmonary:      Effort: Pulmonary effort is normal. No respiratory distress. Neurological:      Mental Status: He is alert and oriented to person, place, and time. Mental status is at baseline. Musculoskeletal:  Gait:  normal    Skin: Clean and intact.   No open sores or wounds    Lymphatics: No palpable lymph nodes    Spine / Hip Exam:      RIGHT  LEFT    Lumbar Spine Exam  [x] All Neg    [x] All Neg     Straight leg raise  []  []Not tested   []  []Not tested    Clonus  []  []Not tested   []  []Not tested    Pain with motion  []  []Not tested   []  []Not tested    Radiculopathy  []  []Not tested   []  []Not tested    Paraspinal muscle tenderness  [] Paraspinal  []Midline   [] Paraspinal  []Midline   Sensation RIGHT  LEFT    L3  [x] Normal []Decreased    [x] Normal []Decreased   L4  [x] Normal  []Decreased   [x] Normal []Decreased   L5  [x] Normal []Decreased   [x] Normal []Decreased   S1  [x] Normal  []Decreased   [x] Normal []Decreased   Pelvis       Scoliosis  [x] Nml  [] Present     Leg-length discrepency  [x] Equal  [] Right longer   [] Left longer   Range of Motion Active Passive Active Passive   Hip Flexion 100  100    Abduction 30  30    External Rotation @ 90 flex 55  55    Internal Rotation @ 90 flex 10  10           Hip risks, benefits, alternatives, including, but not limited to bleeding, pain, infection, skin disruption or discoloration. Laterality was confirmed (timeout). The hip was prepped with alcohol.  Deep ultrasound was used to visualize the femoral head, neck, and acetabular rim. 22-gauge spinal needle was used. I confirmed transducer orientation along the axis of the femoral neck. [employer] ultrasound unit was used with a variable frequency (6.0-15.0 MHz) linear transducer. Ultrasound-guided needle localization to the hip joint was performed. Confirmation of needle placement was performed, and the image was stored  A formulation of 2cc of Celestone, 1cc of 1% lidocaine, 1cc of 0.25% sensoricaine was injected into the hip. Appropriate insufflation deep to the hip capsule was visualized. The site was cleaned and dressed with a band aid. Images were taken and saved for permanent record. Assessment and Plan  Corey Yao was seen today for hip pain. Diagnoses and all orders for this visit:    Right hip pain  -     XR HIP RIGHT (2-3 VIEWS); Future  -     US ARTHR/ASP/INJ MAJOR JNT/BURSA RIGHT; Future  -     bupivacaine (MARCAINE) 0.25 % injection 2.5 mg  -     lidocaine 1 % injection 10 mg  -     betamethasone acetate-betamethasone sodium phosphate (CELESTONE) injection 12 mg    Femoroacetabular impingement of right hip  -     US ARTHR/ASP/INJ MAJOR JNT/BURSA RIGHT; Future  -     bupivacaine (MARCAINE) 0.25 % injection 2.5 mg  -     lidocaine 1 % injection 10 mg  -     betamethasone acetate-betamethasone sodium phosphate (CELESTONE) injection 12 mg        Patient is suffering with right hip impingement. We will perform a right hip intra-articular cortisone injection with ultrasound guidance. Patient will follow-up in the office in 3 months or sooner if problems arise. Notable mention patient is considering arthroscopic surgery on the other side. She will follow-up for her left hip when she makes her decision.     I discussed with Jailene Gigi that her history, symptoms, signs and imaging are most consistent with femoro-acetabular impingement    We reviewed the natural history of these conditions and treatment options ranging from conservative measures (rest, icing, activity modification, physical therapy, pain meds, cortisone injection) to surgical options. In terms of treatment, I recommended continuing with rest, icing, avoidance of painful activities, NSAIDs or pain meds as tolerated, and physical therapy. If these are not effective, cortisone injection can be considered. We discussed surgical options as well, should conservative measures fail. Electronically signed by Alexandra Jiang MD on 6/28/2022 at 10:54 AM  This dictation was generated by voice recognition computer software. Although all attempts are made to edit the dictation for accuracy, there may be errors in the transcription that are not intended.

## 2022-07-07 RX ORDER — BETAMETHASONE SODIUM PHOSPHATE AND BETAMETHASONE ACETATE 3; 3 MG/ML; MG/ML
12 INJECTION, SUSPENSION INTRA-ARTICULAR; INTRALESIONAL; INTRAMUSCULAR; SOFT TISSUE ONCE
Status: COMPLETED | OUTPATIENT
Start: 2022-07-07 | End: 2022-07-07

## 2022-07-07 RX ADMIN — BETAMETHASONE SODIUM PHOSPHATE AND BETAMETHASONE ACETATE 12 MG: 3; 3 INJECTION, SUSPENSION INTRA-ARTICULAR; INTRALESIONAL; INTRAMUSCULAR; SOFT TISSUE at 08:10

## 2022-09-13 ENCOUNTER — TELEPHONE (OUTPATIENT)
Dept: ORTHOPEDIC SURGERY | Age: 42
End: 2022-09-13

## 2022-09-13 NOTE — TELEPHONE ENCOUNTER
Surgery and/or Procedure Scheduling     Contact Name: Ángela Dixon Request: HIP  Patient Contact Number: [phone]

## 2023-02-16 ENCOUNTER — OFFICE VISIT (OUTPATIENT)
Dept: ORTHOPEDIC SURGERY | Age: 43
End: 2023-02-16

## 2023-02-16 VITALS — HEIGHT: 62 IN | WEIGHT: 212 LBS | BODY MASS INDEX: 39.01 KG/M2

## 2023-02-16 DIAGNOSIS — M25.852 FEMOROACETABULAR IMPINGEMENT OF LEFT HIP: Primary | ICD-10-CM

## 2023-02-16 DIAGNOSIS — M25.851 FEMOROACETABULAR IMPINGEMENT OF RIGHT HIP: ICD-10-CM

## 2023-02-16 RX ORDER — BUPIVACAINE HYDROCHLORIDE 2.5 MG/ML
30 INJECTION, SOLUTION INFILTRATION; PERINEURAL ONCE
Status: COMPLETED | OUTPATIENT
Start: 2023-02-16 | End: 2023-02-16

## 2023-02-16 RX ORDER — RIZATRIPTAN BENZOATE 10 MG/1
TABLET ORAL
COMMUNITY

## 2023-02-16 RX ORDER — BETAMETHASONE SODIUM PHOSPHATE AND BETAMETHASONE ACETATE 3; 3 MG/ML; MG/ML
12 INJECTION, SUSPENSION INTRA-ARTICULAR; INTRALESIONAL; INTRAMUSCULAR; SOFT TISSUE ONCE
Status: COMPLETED | OUTPATIENT
Start: 2023-02-16 | End: 2023-02-16

## 2023-02-16 RX ORDER — LIDOCAINE HYDROCHLORIDE 10 MG/ML
20 INJECTION, SOLUTION INFILTRATION; PERINEURAL ONCE
Status: COMPLETED | OUTPATIENT
Start: 2023-02-16 | End: 2023-02-16

## 2023-02-16 RX ORDER — SUMATRIPTAN 50 MG/1
TABLET, FILM COATED ORAL
COMMUNITY

## 2023-02-16 RX ORDER — MELOXICAM 7.5 MG/1
TABLET ORAL
COMMUNITY
Start: 2022-11-30

## 2023-02-16 RX ORDER — TRIAMTERENE AND HYDROCHLOROTHIAZIDE 37.5; 25 MG/1; MG/1
CAPSULE ORAL
COMMUNITY
Start: 2023-01-09

## 2023-02-16 RX ORDER — ONDANSETRON 4 MG/1
TABLET, ORALLY DISINTEGRATING ORAL
COMMUNITY
Start: 2023-01-09

## 2023-02-16 RX ADMIN — BETAMETHASONE SODIUM PHOSPHATE AND BETAMETHASONE ACETATE 12 MG: 3; 3 INJECTION, SUSPENSION INTRA-ARTICULAR; INTRALESIONAL; INTRAMUSCULAR; SOFT TISSUE at 10:27

## 2023-02-16 RX ADMIN — BUPIVACAINE HYDROCHLORIDE 75 MG: 2.5 INJECTION, SOLUTION INFILTRATION; PERINEURAL at 10:28

## 2023-02-16 RX ADMIN — LIDOCAINE HYDROCHLORIDE 20 ML: 10 INJECTION, SOLUTION INFILTRATION; PERINEURAL at 10:28

## 2023-02-16 NOTE — PROGRESS NOTES
Dr Lis Foreman      Date /Time 2/16/2023       12:08 PM EST  Name Bessy Burnett             1980   Location  [facility]  MRN [id number]                Chief Complaint   Patient presents with    Follow-up     Left hip   Pain  Injection 6/30/22     Right hip pain    History of Present Illness    Bessy Burnett is a 38 y.o. female who presents with bilateral, left worse than right hip pain. Occupation:  [employer] at [employer]  Occupational activities: heavy lifting occasionally. Athletic/exercise activity: no sports. Injury Mechanism:  none. Worker's Comp. & legal issues:   none. Previous Treatments: Ice, Heat, NSAIDs, pain medication and Cortisone Injections Lateral cortisone injection of the trochanter    Patient presents to the office today for follow-up visit concerning her left hip. Patient being treated for left hip impingement and labral tear. She did receive a cortisone injection intra-articular June 2022. Patient states that she did have good relief with the injection. Her symptoms have returned without new injury or trauma. Most symptoms are concentrated in her groin. She does have increased pain with activities and improvement with rest.  She has bilateral hip pain. She does not seem ready for the left hip arthroscopy yet although has already had 2 injections. She has had only 1 injection in the right. Previous history: Patient presents to the office today for follow-up visit. February 2022 patient received a cortisone injection intra-articular left hip. She has done well. Her current pain is only 1-2/10. She was asymptomatic for many weeks. Her pain is gradually started to return but symptoms are fairly mild. Symptoms still concentrated over her groin. Previous history: She presents with longstanding history of left hip pain. Remotely, she has had left hip injection done previously for the trochanteric bursitis.   She has had previous stretching exercises and therapy done for this which have not helped thus far. She is here for anteriorly and laterally based hip pain. She underwent an MRI that demonstrated labral tear and she is here for clinical evaluation. She has difficult time getting in and out of her car and tying her shoes. She has a pain when she gets up from a prolonged sitting position. She has difficult time working for long hours and is activity related discomfort    Past History  Past Medical History:   Diagnosis Date    Acid indigestion     Fibromyalgia     GERD (gastroesophageal reflux disease)     Obesity (BMI 30-39. 9)      Past Surgical History:   Procedure Laterality Date    CHOLECYSTECTOMY      ENDOSCOPY, COLON, DIAGNOSTIC      TONSILLECTOMY      TUBAL LIGATION       Family History   Problem Relation Age of Onset    High Blood Pressure Mother     Diabetes Mother     Cancer Father      Social History     Tobacco Use    Smoking status: Former     Packs/day: 0.50     Years: 18.00     Pack years: 9.00     Types: Cigarettes    Smokeless tobacco: Never    Tobacco comments:     smokes half pack now. was smoking 1 pack/day   Substance Use Topics    Alcohol use: No      Current Outpatient Medications on File Prior to Visit   Medication Sig Dispense Refill    triamterene-hydroCHLOROthiazide (DYAZIDE) 37.5-25 MG per capsule       SUMAtriptan (IMITREX) 50 MG tablet sumatriptan 50 mg tablet   TAKE 1 TABLET BY MOUTH EVERY DAY AS NEEDED FOR MIGRAINE. MAY REPEAT AFTER 2 HOURS IF INEFFECTIVE. rizatriptan (MAXALT) 10 MG tablet rizatriptan 10 mg tablet      ondansetron (ZOFRAN-ODT) 4 MG disintegrating tablet       meloxicam (MOBIC) 7.5 MG tablet       pantoprazole (PROTONIX) 20 MG tablet Take 20 mg by mouth daily. ferrous sulfate 325 (65 FE) MG tablet Take 325 mg by mouth daily (with breakfast). (Patient not taking: Reported on 2/16/2023)       No current facility-administered medications on file prior to visit.         ASCVD 10-YEAR RISK SCORE  The ASCVD Risk score (Edison DEAN, et al., 2019) failed to calculate for the following reasons: The systolic blood pressure is missing    Cannot find a previous HDL lab    Cannot find a previous total cholesterol lab   . Review of Systems  10-point ROS is negative other than HPI. Physical Exam  Based off 1997 Exam Criteria  Ht 5' 2\" (1.575 m)   Wt 212 lb (96.2 kg)   BMI 38.78 kg/m²      Constitutional:       General: He is not in acute distress. Appearance: Normal appearance. Obese. Cardiovascular:      Rate and Rhythm: Normal rate and regular rhythm. Pulses: Normal pulses. Pulmonary:      Effort: Pulmonary effort is normal. No respiratory distress. Neurological:      Mental Status: He is alert and oriented to person, place, and time. Mental status is at baseline.      Musculoskeletal:  Gait:  antalgic    Spine / Hip Exam:      RIGHT  LEFT    Lumbar Spine Exam  [x] All Neg    [x] All Neg     Straight leg raise  []  []Not tested   []  []Not tested    Clonus  []  []Not tested   []  []Not tested    Pain with motion  []  []Not tested   []  []Not tested    Radiculopathy  []  []Not tested   []  []Not tested    Paraspinal muscle tenderness  [] Paraspinal  []Midline   [] Paraspinal  []Midline   Sensation RIGHT  LEFT    L3  [x] Normal []Decreased    [x] Normal []Decreased   L4  [x] Normal  []Decreased   [x] Normal []Decreased   L5  [x] Normal []Decreased   [x] Normal []Decreased   S1  [x] Normal  []Decreased   [x] Normal []Decreased   Pelvis       Scoliosis  [x] Nml  [] Present     Leg-length discrepency  [x] Equal  [] Right longer   [] Left longer   Range of Motion Active Passive Active Passive   Hip Flexion 110  110    Abduction 50  50    External Rotation @ 90 flex 65  65    Internal Rotation @ 90 flex 10  10           Hip Impingement / Dysplasia  [] All Neg  [] Not tested   [] All Neg  [] Not tested    Hip impingement test  [x]  []Not tested   [x]  []Not tested    C-sign  [x]  []Not tested [x]  []Not tested    Anterior instability apprehension  []  []Not tested   []  []Not tested    Posterior instability apprehension  []  []Not tested   []  []Not tested    Uncontained Internal rotation  []  []Not tested  []  []Not tested          Abductors  [] All Neg  [] Not tested   [] All Neg  [] Not tested    Medius strength  []  []Not tested   []  []Not tested    Minimum strength  []  []Not tested   []  []Not tested    IT band tendonitis  []  []Not tested   []  []Not tested    Trochanteric tenderness  []  []Not tested  []  []Not tested   Sciatic neuropathic pain  []  []Not tested   []  []Not tested           Post-arthroplasty  [] All Neg  [] Not tested   [] All Neg  [] Not tested    Rectus tendonitis  []  []Not tested   []  []Not tested    Iliopsoas tendonitis       Start-up pain  []  []Not tested   []  []Not tested      Positive Stinchfield and impingement signs. Also tenderness present over the trochanter, antalgic gait. Imaging    Left Hip: [facility] [place]  Radiographs: X-rays ordered and reviewed the left hip.  3 views. AP pelvis, lateral, false profile. They do demonstrate a cam lesion with impingement cyst femoral neck. Well-maintained joint space. No evidence of fractures or dislocations. New x-rays today demonstrate the same findings as before with worsening impingement and cam lesion present bilaterally    MRI dated 2021: Demonstrates anterolateral labral tear, confirm signs of WILMAR, demonstrate signs of trochanteric bursitis without evidence of true rupture.       Procedure:  Orders Placed This Encounter   Procedures    XR HIP LEFT (2-3 VIEWS)     Standing Status:   Future     Number of Occurrences:   1     Standing Expiration Date:   2/13/2024     Order Specific Question:   Reason for exam:     Answer:   pain    US ARTHR/ASP/INJ MAJOR JNT/BURSA RIGHT     Standing Status:   Future     Standing Expiration Date:   2/16/2024     Right hip Cortisone Injection - Ultrasound-guided CPT: 06281  Consent was obtained after discussion of the risks, benefits, alternatives, including, but not limited to bleeding, pain, infection, skin disruption or discoloration. Laterality was confirmed (timeout). The hip was prepped with alcohol. Deep ultrasound was used to visualize the femoral head, neck, and acetabular rim. 22-gauge spinal needle was used. I confirmed transducer orientation along the axis of the femoral neck. SonNativeAD ultrasound unit was used with a variable frequency (6.0-15.0 MHz) linear transducer. Ultrasound-guided needle localization to the hip joint was performed. Confirmation of needle placement was performed, and the image was stored  A formulation of 2cc of Celestone, 1cc of 1% lidocaine, 1cc of 0.25% sensoricaine was injected into the hip. Appropriate insufflation deep to the hip capsule was visualized. The site was cleaned and dressed with a band aid. Images were taken and saved for permanent record. .      Assessment and Plan  Estee Brooks was seen today for hip pain. Diagnoses and all orders for this visit:    Tear of left acetabular labrum, subsequent encounter  -     US ARTHR/ASP/INJ MAJOR JNT/BURSA LEFT; Future  -     bupivacaine (MARCAINE) 0.25 % injection 10 mg  -     lidocaine 1 % injection 40 mg  -     Celestone 2 cc    Impingement syndrome, hip, left  Impingement syndrome right hip      I discussed with Paulina Danielle that her history, symptoms, signs and imaging are most consistent with labral tear, femoro-acetabular impingement and Trochanteric bursitis . She needs a left hip arthroscopy even more so than right. She needs a femoroplasty and labral repair. However, she has some social concerns not been able to have the finances to take time off. In addition, she is worried about work restrictions. I gave her 4 to 6 weeks back to work with walking and being able to get around reasonably well. I gave her 3 months before I released full duty unrestricted.   She will continue to think about this. We delved into the details of hip arthroscopy and the associated repair and recovery, as well as risks. Even though her left is worse than right, she has already had 2 injections and that 1 therefore she cannot have anymore. I am giving her a right hip injection of these for today. She has a phobia of surgery considering her multiple surgeries she required following hysterectomy and multiple complications including infection. We reviewed the natural history of these conditions and treatment options ranging from conservative measures (rest, icing, activity modification, physical therapy, pain meds, cortisone injection) to surgical options. In terms of treatment, I recommended continuing with rest, icing, avoidance of painful activities, NSAIDs or pain meds as tolerated, and physical therapy. If these are not effective, cortisone injection can be considered. We discussed surgical options as well, should conservative measures fail. Electronically signed by Ciara Cordoba MD on 2/16/2023 at 10:27 AM  This dictation was generated by voice recognition computer software. Although all attempts are made to edit the dictation for accuracy, there may be errors in the transcription that are not intended.